# Patient Record
Sex: MALE | Race: WHITE
[De-identification: names, ages, dates, MRNs, and addresses within clinical notes are randomized per-mention and may not be internally consistent; named-entity substitution may affect disease eponyms.]

---

## 2019-01-17 ENCOUNTER — HOSPITAL ENCOUNTER (EMERGENCY)
Dept: HOSPITAL 47 - EC | Age: 19
Discharge: HOME | End: 2019-01-17
Payer: COMMERCIAL

## 2019-01-17 VITALS
RESPIRATION RATE: 18 BRPM | DIASTOLIC BLOOD PRESSURE: 78 MMHG | HEART RATE: 84 BPM | SYSTOLIC BLOOD PRESSURE: 126 MMHG | TEMPERATURE: 98.3 F

## 2019-01-17 DIAGNOSIS — S61.215A: Primary | ICD-10-CM

## 2019-01-17 DIAGNOSIS — Z91.018: ICD-10-CM

## 2019-01-17 DIAGNOSIS — Z23: ICD-10-CM
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
## 2019-01-17 DIAGNOSIS — F17.200: ICD-10-CM

## 2019-01-17 DIAGNOSIS — S61.211A: ICD-10-CM

## 2019-01-17 PROCEDURE — 99283 EMERGENCY DEPT VISIT LOW MDM: CPT

## 2019-01-17 PROCEDURE — 90715 TDAP VACCINE 7 YRS/> IM: CPT

## 2019-01-17 PROCEDURE — 90471 IMMUNIZATION ADMIN: CPT

## 2019-01-17 PROCEDURE — 12002 RPR S/N/AX/GEN/TRNK2.6-7.5CM: CPT

## 2019-01-17 NOTE — ED
General Adult HPI





- General


Chief complaint: Wound/Laceration


Stated complaint: right hand laceration


Time Seen by Provider: 01/17/19 15:10


Source: patient, RN notes reviewed, old records reviewed


Mode of arrival: ambulatory


Limitations: no limitations





- History of Present Illness


Initial comments: 


18-year-old male patient with no pertinent past medical history presents to ED 

after sustaining a laceration to his fourth and fifth digits of his palmar L 

hand distal to the MCP joint.  Patient reports that this laceration occurred on 

a piece of metal while working on the brakes on his car.  Patient has 2 

lacerations on the palmar aspect of the fourth and fifth digits distal to the 

MCP joint, the laceration on his 4th digit is approximately .5 cm, superficial. 

The laceration on his 5th digit is approximately 2cm.  Patient denies any other 

injury or complaint.  Patient states that last tetanus shot was 6 years ago.





Systemic: Pt denies fatigue, myalgia, fever/chills, rash. Pt denies weakness, 

night sweats, weight loss. 


Neuro: Pt denies headache, visual disturbances, syncope or pre-syncope.


HEENT: Pt denies ocular discharge or irritation, otalgia, rhinorrhea, 

pharyngitis or notable lymphadenopathy. 


Cardiopulmonary: Pt denies chest pain, SOB, heart palpitations, dyspnea on 

exertion.  


Abdominal/GI: Pt denies abdominal pain, n/v/d. 


: Pt denies dysuria, burning w/ urination, frequency/urgency. Denies new 

onset urinary or bowel incontinence.  


MSK: Pt denies myalgia, loss of strength or function in extremities. 


Neuro: Pt denies new onset weakness, paresthesias. 








- Related Data


 Previous Rx's











 Medication  Instructions  Recorded


 


Cephalexin [Keflex] 500 mg PO Q12HR 3 Days #6 cap 01/17/19











 Allergies











Allergy/AdvReac Type Severity Reaction Status Date / Time


 


cashew nut Allergy  Anaphylaxis Verified 01/17/19 15:07


 


maltodextrin Allergy  Diarrhea Verified 01/17/19 15:07














Review of Systems


ROS Statement: 


Those systems with pertinent positive or pertinent negative responses have been 

documented in the HPI.





ROS Other: All systems not noted in ROS Statement are negative.





Past Medical History


Past Medical History: No Reported History


History of Any Multi-Drug Resistant Organisms: None Reported


Past Surgical History: No Surgical Hx Reported


Past Psychological History: No Psychological Hx Reported


Smoking Status: Current every day smoker


Past Alcohol Use History: None Reported


Past Drug Use History: Marijuana





General Exam





- General Exam Comments


Initial Comments: 


Constitutional: NAD, AOX3, Pt has pleasant affect. 


HEENT: NC/AT, trachea midline, neck supple, no lymphadenopathy. Posterior 

pharynx non erythematous, without exudates. External ears appear normal, 

without discharge. Mucous membranes moist. Eyes PERRLA, EOM intact. There is no 

scleral icterus. No pallor noted. 


Cardiopulmonary: RRR, no murmurs, rubs or gallops, no JVD noted. Lungs CTAB in 

anterior and posterior fields. No peripheral edema. 


Abdominal exam: Abdomen soft and non-distended. Abdomen non-tender to palpation 

in all 4 quadrants. Bowel sounds active in LLQ. No hepatosplenomegaly. No 

ecchymosis


Neuro: CN II-XII grossly intact. No nuchal rigidity. 


MSK: two lacerations noted on palmar aspect of left hand.  0.5 centimeter 

laceration on palmar aspect of left fourth digit distal to MCP, superficial.  2 

cm laceration to palmar aspect of fifth digit on left hand distal to MCP joint, 

mildly superficial but requiring closure. No posterior calf tenderness 

bilaterally, homans sign negative bilaterally. Posterior tibialis and radial 

pulse +2 bilaterally. Sensation intact in upper and lower extremities. Full 

active ROM in upper and lower extremities, 5/5 stregnth. 





Limitations: no limitations





Course


 Vital Signs











  01/17/19





  15:07


 


Temperature 98.3 F


 


Pulse Rate 84


 


Respiratory 18





Rate 


 


Blood Pressure 126/78


 


O2 Sat by Pulse 100





Oximetry 














Procedures





- Laceration


  ** Laceration #1


Consent Obtained: verbal consent


Indication: laceration


Site: hand (5th digit on L hand )


Size (cm): 2


Description: linear


Depth: simple, single layer


Anesthetic Used: lidocaine 1%


Anesthesia Technique: local infiltration


Amount (mls): 2


Pre-repair: wound explored, irrigated extensively (1 L of NS), deep structures 

intact


Type of Sutures: nylon


Size of Sutures: 5-0


Number of Sutures: 4


Technique: simple, interrupted


Patient Tolerated Procedure: well, no complications





  ** Laceration #2


Consent Obtained: verbal consent


Time Out Performed: Yes


Site: hand (4th digit L hand )


Size (cm): 1 (0.5 cm )


Description: linear


Depth: simple, single layer


Pre-repair: wound explored, irrigated extensively (1L NS), deep structures 

intact


Type of Sutures: other (exofin )


Patient Tolerated Procedure: well, no complications





Medical Decision Making





- Medical Decision Making


18-year-old male patient with no pertinent past medical history presents to ED 

after sustaining a laceration to his fourth and fifth digits of his palmar L 

hand distal to the MCP joint.  Patient reports that this laceration occurred on 

a piece of metal while working on the brakes on his car. Pt VSS, afebrile. 

Physical exam displayed: two lacerations noted on palmar aspect of left hand.  

0.5 centimeter laceration on palmar aspect of left fourth digit distal to MCP, 

superficial.  2 cm laceration to palmar aspect of fifth digit on left hand 

distal to MCP joint, mildly superficial but requiring closure. Both wounds 

vigorously irrigated with 1L of normal saline. Laceration on 4th digit was 

closed with exofin glue. Wound on 5th digit was closed with 4 simple 

interrupted sutures. Pt rx 3 days of prophylactic keflex.  Pt educated 

extensively intensive symptoms of infection, pt verbalized understanding.  

Patient to return to ED immediately if s/sx of infection develop, new signs 

symptoms develop, or if condition worsens in any way.  Patient follow up with 

PCP in 1-2 days.  Patient to return to ED in 7-10 days for suture removal. Case 

discussed with Dr. Padilla. Tetanus updated. 








Disposition


Clinical Impression: 


 Laceration





Disposition: HOME SELF-CARE


Condition: Stable


Instructions:  Care For Your Stitches (ED), Laceration (ED)


Additional Instructions: 


Patient to adhere to previously discussed treatment plan and will take 

medication(s) as directed. Patient to follow up with PCP in 1-2 days. Patient 

to return to ED if symptoms do not improve. 








Prescriptions: 


Cephalexin [Keflex] 500 mg PO Q12HR 3 Days #6 cap


Is patient prescribed a controlled substance at d/c from ED?: No


Referrals: 


Irvin Rashid MD [Primary Care Provider] - 1-2 days


Time of Disposition: 15:51

## 2019-03-25 ENCOUNTER — HOSPITAL ENCOUNTER (OUTPATIENT)
Dept: HOSPITAL 47 - RADXRMAIN | Age: 19
Discharge: HOME | End: 2019-03-25
Payer: COMMERCIAL

## 2019-03-25 DIAGNOSIS — S61.221A: ICD-10-CM

## 2019-03-25 DIAGNOSIS — S61.202A: ICD-10-CM

## 2019-03-25 DIAGNOSIS — S61.200A: Primary | ICD-10-CM

## 2019-03-25 NOTE — XR
EXAMINATION TYPE: XR hand complete RT

 

DATE OF EXAM: 3/25/2019

 

CLINICAL HISTORY: Injury with pain.

 

TECHNIQUE:  Frontal, lateral and oblique images of the right hand are obtained.

 

COMPARISON: None.

 

FINDINGS:  There is no acute fracture/dislocation evident in the right hand. The joint spaces in the 
right hand appear within normal limits.  The overlying soft tissue appears unremarkable without suspi
cious metallic or radiodense foreign body identified.

 

IMPRESSION:  There is no acute fracture or dislocation in the right hand.

## 2019-12-24 ENCOUNTER — HOSPITAL ENCOUNTER (EMERGENCY)
Dept: HOSPITAL 47 - EC | Age: 19
Discharge: HOME | End: 2019-12-24
Payer: COMMERCIAL

## 2019-12-24 VITALS — RESPIRATION RATE: 15 BRPM | HEART RATE: 63 BPM | DIASTOLIC BLOOD PRESSURE: 80 MMHG | SYSTOLIC BLOOD PRESSURE: 114 MMHG

## 2019-12-24 VITALS — TEMPERATURE: 98.3 F

## 2019-12-24 DIAGNOSIS — Z91.018: ICD-10-CM

## 2019-12-24 DIAGNOSIS — J06.9: Primary | ICD-10-CM

## 2019-12-24 DIAGNOSIS — Z88.8: ICD-10-CM

## 2019-12-24 PROCEDURE — 99283 EMERGENCY DEPT VISIT LOW MDM: CPT

## 2019-12-24 PROCEDURE — 71046 X-RAY EXAM CHEST 2 VIEWS: CPT

## 2019-12-24 NOTE — XR
EXAMINATION TYPE: XR chest 2V

 

DATE OF EXAM: 12/24/2019

 

COMPARISON: None HISTORY: Cough

 

TECHNIQUE: 2 views

 

FINDINGS: Heart and mediastinum are normal. Lungs are clear. Diaphragm is normal. Bony thorax appears
 normal.

 

IMPRESSION: Normal chest.

## 2019-12-24 NOTE — ED
General Adult HPI





- General


Chief complaint: Upper Respiratory Infection


Stated complaint: Cough


Time Seen by Provider: 12/24/19 15:05


Source: patient


Mode of arrival: ambulatory


Limitations: no limitations





- History of Present Illness


Initial comments: 





Patient presents the ED with his mother for evaluation.  Patient states that he 

has had a cough productive of dark colored sputum for the past week or so.  

Patient states that his productive cough seems to be worse in the morning.  

Patient states that he has been around other people who have been sick recently.

 Patient states that he "vapes" nicotine, but he denies vaping anything else.  

Patient denies having any pain, fever or chills, headache, sore throat, chest 

pain, dyspnea, palpitations, dizziness, abdominal pain, nausea/vomiting, urinary

symptoms, leg or calf swelling or pain, or any other symptoms or complaints.





- Related Data


                                  Previous Rx's











 Medication  Instructions  Recorded


 


Cephalexin [Keflex] 500 mg PO Q12HR 3 Days #6 cap 01/17/19











                                    Allergies











Allergy/AdvReac Type Severity Reaction Status Date / Time


 


cashew nut Allergy  Anaphylaxis Verified 01/17/19 15:07


 


maltodextrin Allergy  Diarrhea Verified 01/17/19 15:07














Review of Systems


ROS Statement: 


Those systems with pertinent positive or pertinent negative responses have been 

documented in the HPI.





ROS Other: All systems not noted in ROS Statement are negative.





Past Medical History


Past Medical History: No Reported History


History of Any Multi-Drug Resistant Organisms: None Reported


Past Surgical History: No Surgical Hx Reported


Past Psychological History: No Psychological Hx Reported


Smoking Status: Never smoker


Past Alcohol Use History: Rare


Past Drug Use History: Marijuana





General Exam


Limitations: no limitations


General appearance: alert, in no apparent distress


Head exam: Present: atraumatic, normocephalic


Eye exam: Present: normal appearance, EOMI


ENT exam: Present: normal oropharynx, mucous membranes moist


Neck exam: Present: other (Trachea is in midline)


Respiratory exam: Present: normal lung sounds bilaterally.  Absent: respiratory 

distress, wheezes, rales, rhonchi


Cardiovascular Exam: Present: regular rate, normal rhythm, normal heart sounds, 

other (Normal radial pulses bilaterally)


GI/Abdominal exam: Present: soft.  Absent: distended, tenderness


Extremities exam: Absent: tenderness, pedal edema, calf tenderness


Neurological exam: Present: alert, oriented X3.  Absent: motor sensory deficit


Psychiatric exam: Present: normal affect, normal mood


Skin exam: Present: warm, dry, intact, normal color





Course


                                   Vital Signs











  12/24/19 12/24/19 12/24/19





  14:59 15:29 15:31


 


Temperature 98.3 F  98.3 F


 


Pulse Rate 81  82


 


Respiratory 18 19 16





Rate   


 


Blood Pressure 119/76  119/84


 


O2 Sat by Pulse 98  99





Oximetry   














Medical Decision Making





- Medical Decision Making





Patient remains alert and breathing comfortably with clear breath sounds 

bilaterally and a normal room air oxygen saturation.  Patient is afebrile.  Yue

ent's chest x-ray is unremarkable.  I suspect that the patient's symptoms may be

 secondary to a viral URI, and I do not suspect that they are from an emergent 

medical condition.  Patient was instructed to stop vaping.  Patient and mother 

were counseled about upper respiratory infections, and they both feel 

comfortable with the patient going home at this time.  Patient was clearly 

explained return and follow-up instructions, and he was instructed to follow up 

closely with his primary care provider.





- Radiology Data


Radiology results: image reviewed (Chest x-ray is negative)





Disposition


Clinical Impression: 


 Upper respiratory tract infection





Disposition: HOME SELF-CARE


Condition: Stable


Instructions (If sedation given, give patient instructions):  Upper Respiratory 

Infection (ED)


Additional Instructions: 


Return to the ER immediately should you develop shortness of breath, chest pain,

 a high fever, vomiting, feeling dizzy or faint, or new or worsening symptoms.  

Follow up closely with your primary care provider.


Is patient prescribed a controlled substance at d/c from ED?: No


Referrals: 


Irvin Rashid MD [Primary Care Provider] - 1-2 days


Time of Disposition: 16:41